# Patient Record
Sex: MALE | Race: WHITE
[De-identification: names, ages, dates, MRNs, and addresses within clinical notes are randomized per-mention and may not be internally consistent; named-entity substitution may affect disease eponyms.]

---

## 2017-11-02 ENCOUNTER — HOSPITAL ENCOUNTER (EMERGENCY)
Dept: HOSPITAL 92 - ERS | Age: 37
Discharge: HOME | End: 2017-11-02
Payer: SELF-PAY

## 2017-11-02 DIAGNOSIS — J45.909: ICD-10-CM

## 2017-11-02 DIAGNOSIS — F17.210: ICD-10-CM

## 2017-11-02 DIAGNOSIS — A08.4: Primary | ICD-10-CM

## 2017-11-02 DIAGNOSIS — K21.9: ICD-10-CM

## 2017-11-02 LAB
ALP SERPL-CCNC: 235 U/L (ref 40–150)
ALT SERPL W P-5'-P-CCNC: 98 U/L (ref 8–55)
ANION GAP SERPL CALC-SCNC: 14 MMOL/L (ref 10–20)
AST SERPL-CCNC: 64 U/L (ref 5–34)
BASOPHILS # BLD AUTO: 0.1 THOU/UL (ref 0–0.2)
BASOPHILS NFR BLD AUTO: 1.1 % (ref 0–1)
BILIRUB SERPL-MCNC: 0.6 MG/DL (ref 0.2–1.2)
BUN SERPL-MCNC: 16 MG/DL (ref 8.9–20.6)
CALCIUM SERPL-MCNC: 9.7 MG/DL (ref 7.8–10.44)
CHLORIDE SERPL-SCNC: 105 MMOL/L (ref 98–107)
CO2 SERPL-SCNC: 26 MMOL/L (ref 22–29)
CREAT CL PREDICTED SERPL C-G-VRATE: 0 ML/MIN (ref 70–130)
EOSINOPHIL # BLD AUTO: 0.2 THOU/UL (ref 0–0.7)
EOSINOPHIL NFR BLD AUTO: 2.8 % (ref 0–10)
GLOBULIN SER CALC-MCNC: 3.3 G/DL (ref 2.4–3.5)
HCT VFR BLD CALC: 49.3 % (ref 42–52)
LYMPHOCYTES # BLD: 2.1 THOU/UL (ref 1.2–3.4)
LYMPHOCYTES NFR BLD AUTO: 25.4 % (ref 21–51)
MONOCYTES # BLD AUTO: 0.4 THOU/UL (ref 0.11–0.59)
MONOCYTES NFR BLD AUTO: 4.2 % (ref 0–10)
NEUTROPHILS # BLD AUTO: 5.5 THOU/UL (ref 1.4–6.5)
RBC # BLD AUTO: 5.19 MILL/UL (ref 4.7–6.1)
WBC # BLD AUTO: 8.3 THOU/UL (ref 4.8–10.8)

## 2017-11-02 PROCEDURE — 80053 COMPREHEN METABOLIC PANEL: CPT

## 2017-11-02 PROCEDURE — 96361 HYDRATE IV INFUSION ADD-ON: CPT

## 2017-11-02 PROCEDURE — 85025 COMPLETE CBC W/AUTO DIFF WBC: CPT

## 2017-11-02 PROCEDURE — 96374 THER/PROPH/DIAG INJ IV PUSH: CPT

## 2017-11-02 PROCEDURE — 80074 ACUTE HEPATITIS PANEL: CPT

## 2017-11-02 PROCEDURE — 81003 URINALYSIS AUTO W/O SCOPE: CPT

## 2017-11-02 PROCEDURE — 86140 C-REACTIVE PROTEIN: CPT

## 2018-03-07 NOTE — CT
CT BRAIN PERFORMED WITHOUT CONTRAST ENHANCEMENT:

 

HISTORY:

Headache x2 days.

 

FINDINGS:

The ventricular and cisternal system is within normal limits.  There are no signs of intracerebral he
morrhage or extraaxial fluid collections.  The mastoid air cells are clear.  There is some minimal bi
lateral ethmoid and maxillary sinus mucosal change.

 

IMPRESSION:

No acute intracranial abnormalities.

 

POS: [facility]